# Patient Record
Sex: FEMALE | URBAN - NONMETROPOLITAN AREA
[De-identification: names, ages, dates, MRNs, and addresses within clinical notes are randomized per-mention and may not be internally consistent; named-entity substitution may affect disease eponyms.]

---

## 2022-11-15 ENCOUNTER — OFFICE VISIT (OUTPATIENT)
Dept: ENT CLINIC | Age: 77
End: 2022-11-15
Payer: MEDICARE

## 2022-11-15 VITALS
DIASTOLIC BLOOD PRESSURE: 76 MMHG | WEIGHT: 202 LBS | HEIGHT: 66 IN | SYSTOLIC BLOOD PRESSURE: 134 MMHG | BODY MASS INDEX: 32.47 KG/M2

## 2022-11-15 DIAGNOSIS — H69.82 EUSTACHIAN TUBE DYSFUNCTION, LEFT: Primary | ICD-10-CM

## 2022-11-15 PROCEDURE — 1123F ACP DISCUSS/DSCN MKR DOCD: CPT | Performed by: PHYSICIAN ASSISTANT

## 2022-11-15 PROCEDURE — 99203 OFFICE O/P NEW LOW 30 MIN: CPT | Performed by: PHYSICIAN ASSISTANT

## 2022-11-15 ASSESSMENT — ENCOUNTER SYMPTOMS
TROUBLE SWALLOWING: 0
VOICE CHANGE: 0
EYE DISCHARGE: 0
SORE THROAT: 0
SINUS PAIN: 0
EYE PAIN: 0
FACIAL SWELLING: 0
SINUS PRESSURE: 0
RHINORRHEA: 0

## 2022-11-15 NOTE — ASSESSMENT & PLAN NOTE
Eustachian tube dysfunction of the left ear-currently resolved based on today's exam  Plan: I advised the patient to call if she has recurring symptoms. I advised the patient that this is normally from an allergic etiology. I advised the patient that if her symptoms continue to recur, would consider a referral to Dr. Joellen Montoya for allergy testing.

## 2025-07-30 NOTE — PROGRESS NOTES
Pt has been advised per Dr. Núñez and verbalized comprehension.  HC          Copied from CRM #[id number]. Topic: General Inquiry - Patient Advice  >> Jul 29, 2025  4:48 PM Jen Núñez wrote:  It is common to have some pain at the site of the injection.  The injection we performed on the 23rd was to treat her right leg pain, however it can take up to 2 weeks for the effect of the steroid to kick in.  I am hopeful she will start feeling better within the next week.  If her pain becomes severe or worsens, we recommend evaluation in urgent care or the ED. She should have a follow up scheduled sometime next week.  If she does not have a follow up already scheduled, please assist her in scheduling one with myself or Munir.  Thanks!  ----- Message -----  From: Melina Mathias MA  Sent: 7/29/2025   4:36 PM CDT  To: Jen Núñez, DO    Pt. Is having unusual right leg pain and she had an injection on lower back, pt is having right leg pain now and wanted to know if it is possible that the injection would have caused this increase in pain?Please advise.Melina Mathias MA  ----- Message -----  From: Lucia Fields  Sent: 7/29/2025   9:16 AM CDT  To: Niya Hannah     [facility] OTOLARYNGOLOGY/ENT  Ms. Mita Avina is a pleasant 64-year-old  female that was referred by Dr. Ludwin Javed due to problems with left ear pain. Patient reports about 6 weeks ago she developed a lot of pressure to the left ear with muffled hearing. She admits to working outside prior to the onset of symptoms. She was treated with antibiotic and steroids and reports that after about a month, the ears popped and returned back to baseline. Due to this issue, she was recommended evaluation. She currently denies any issues with fever, chills, or drainage from external canal.  Patient does admit to some sporadic dizziness that now has resolved since her ears are back to normal.        Allergies: Cephalexin, Naproxen, Iodides, Iodinated diagnostic agents, and Sulfa antibiotics      No current outpatient medications on file. No current facility-administered medications for this visit. Past Surgical History:   Procedure Laterality Date    CARPAL TUNNEL RELEASE      CATARACT REMOVAL      CHOLECYSTECTOMY      HYSTERECTOMY, TOTAL ABDOMINAL (CERVIX REMOVED)      KIDNEY STONE REMOVAL         No past medical history on file. No family history on file. Social History     Tobacco Use    Smoking status: Never    Smokeless tobacco: Never   Substance Use Topics    Alcohol use: Never           REVIEW OF SYSTEMS:  all other systems reviewed and are negative  Review of Systems   Constitutional:  Negative for chills and fever. HENT:  Negative for congestion, dental problem, ear discharge, ear pain, facial swelling, hearing loss, nosebleeds, postnasal drip, rhinorrhea, sinus pressure, sinus pain, sneezing, sore throat, tinnitus, trouble swallowing and voice change. Eyes:  Negative for pain and discharge. Neurological:  Negative for dizziness and headaches.          Comments:     PHYSICAL EXAM:    /76   Ht 5' 6\" (1.676 m)   Wt 202 lb (91.6 kg)   BMI 32.60 kg/m²   Body mass index is 32.6 kg/m². General Appearance: well developed  and well nourished  Head/ Face: normocephalic and atraumatic  Vocal Quality: good/ normal  Ears: Right Ear: External: external ears normal Otoscopy Ear Canal: canal clear Otoscopy TM: TM's normal and TM's mobile Left Ear: External: external ears normal Otoscopy Ear Canal: canal clear Otoscopy TM: TM's normal and TM's mobile  Hearing: Rinne A>B: Left, Rinne A>B: Right, and Oliva M  Nose: nares normal and septum midline  Neck: supple and adenopathy none palpable  Thyroid: normal and nodules No  Oral exam demonstrated the tongue to be midline with no abnormalities to the posterior pharynx. Assessment & Plan:    Problem List Items Addressed This Visit       Eustachian tube dysfunction, left - Primary     Eustachian tube dysfunction of the left ear-currently resolved based on today's exam  Plan: I advised the patient to call if she has recurring symptoms. I advised the patient that this is normally from an allergic etiology. I advised the patient that if her symptoms continue to recur, would consider a referral to Dr. Lissa Tolliver for allergy testing. No orders of the defined types were placed in this encounter. No orders of the defined types were placed in this encounter. Electronically signed by Nadia Grimm PA-C on 11/15/22 at 4:32 PM CST        Please note that this chart was generated using dragon dictation software. Although every effort was made to ensure the accuracy of this automated transcription, some errors in transcription may have occurred.